# Patient Record
Sex: FEMALE | Race: WHITE | HISPANIC OR LATINO | Employment: FULL TIME | URBAN - METROPOLITAN AREA
[De-identification: names, ages, dates, MRNs, and addresses within clinical notes are randomized per-mention and may not be internally consistent; named-entity substitution may affect disease eponyms.]

---

## 2017-09-17 ENCOUNTER — HOSPITAL ENCOUNTER (EMERGENCY)
Facility: MEDICAL CENTER | Age: 27
End: 2017-09-17
Attending: EMERGENCY MEDICINE
Payer: COMMERCIAL

## 2017-09-17 VITALS
WEIGHT: 158.73 LBS | HEART RATE: 81 BPM | DIASTOLIC BLOOD PRESSURE: 59 MMHG | TEMPERATURE: 98.9 F | RESPIRATION RATE: 16 BRPM | BODY MASS INDEX: 32 KG/M2 | SYSTOLIC BLOOD PRESSURE: 113 MMHG | HEIGHT: 59 IN | OXYGEN SATURATION: 98 %

## 2017-09-17 DIAGNOSIS — H66.92 ACUTE LEFT OTITIS MEDIA: ICD-10-CM

## 2017-09-17 DIAGNOSIS — H61.22 IMPACTED CERUMEN OF LEFT EAR: ICD-10-CM

## 2017-09-17 PROCEDURE — A9270 NON-COVERED ITEM OR SERVICE: HCPCS | Performed by: EMERGENCY MEDICINE

## 2017-09-17 PROCEDURE — 69209 REMOVE IMPACTED EAR WAX UNI: CPT

## 2017-09-17 PROCEDURE — 700112 HCHG RX REV CODE 229: Performed by: EMERGENCY MEDICINE

## 2017-09-17 PROCEDURE — 99283 EMERGENCY DEPT VISIT LOW MDM: CPT

## 2017-09-17 PROCEDURE — 700102 HCHG RX REV CODE 250 W/ 637 OVERRIDE(OP): Performed by: EMERGENCY MEDICINE

## 2017-09-17 RX ORDER — HYDROCODONE BITARTRATE AND ACETAMINOPHEN 5; 325 MG/1; MG/1
1 TABLET ORAL ONCE
Status: COMPLETED | OUTPATIENT
Start: 2017-09-17 | End: 2017-09-17

## 2017-09-17 RX ORDER — DOCUSATE SODIUM 50 MG/5ML
100 LIQUID ORAL ONCE
Status: COMPLETED | OUTPATIENT
Start: 2017-09-17 | End: 2017-09-17

## 2017-09-17 RX ORDER — AMOXICILLIN 875 MG/1
875 TABLET, COATED ORAL 2 TIMES DAILY
Qty: 20 TAB | Refills: 0 | Status: SHIPPED | OUTPATIENT
Start: 2017-09-17

## 2017-09-17 RX ORDER — AMOXICILLIN 250 MG/1
500 CAPSULE ORAL ONCE
Status: COMPLETED | OUTPATIENT
Start: 2017-09-17 | End: 2017-09-17

## 2017-09-17 RX ADMIN — AMOXICILLIN 500 MG: 250 CAPSULE ORAL at 20:59

## 2017-09-17 RX ADMIN — HYDROCODONE BITARTRATE AND ACETAMINOPHEN 1 TABLET: 5; 325 TABLET ORAL at 20:12

## 2017-09-17 RX ADMIN — DOCUSATE SODIUM 100 MG: 50 LIQUID ORAL at 20:18

## 2017-09-17 ASSESSMENT — PAIN SCALES - GENERAL: PAINLEVEL_OUTOF10: 5

## 2017-09-18 NOTE — ED NOTES
Discharge instructions and rx given. Educated on when to return to ed and follow up with ENT. Pt verbalized understanding.

## 2017-09-18 NOTE — ED PROVIDER NOTES
"ED Provider Note    CHIEF COMPLAINT  Chief Complaint   Patient presents with   • Earache       HPI  Luci Mcclain is a 27 y.o. female who presentsFor evaluation of pain to her left ear that started yesterday. She complains of some decreased hearing she's had no trauma to the year denies any recent swimming she has no fever chills headache. Denies any history of previous infections that she is aware of    REVIEW OF SYSTEMS  See HPI for further details. She denies cough nausea vomiting abdominal pain    PAST MEDICAL HISTORY  No past medical history on file.    FAMILY HISTORY  History reviewed. No pertinent family history.    SOCIAL HISTORY  Social History     Social History   • Marital status:      Spouse name: N/A   • Number of children: N/A   • Years of education: N/A     Social History Main Topics   • Smoking status: Never Smoker   • Smokeless tobacco: Never Used   • Alcohol use Yes      Comment: Occasionally   • Drug use: No   • Sexual activity: Not on file     Other Topics Concern   • Not on file     Social History Narrative   • No narrative on file       SURGICAL HISTORY  No past surgical history on file.    CURRENT MEDICATIONS  Home Medications     Reviewed by Gurinder Cali R.N. (Registered Nurse) on 09/17/17 at 1843  Med List Status: Complete   Medication Last Dose Status        Patient Franco Taking any Medications                        ALLERGIES  No Known Allergies    PHYSICAL EXAM  VITAL SIGNS: Pulse 88   Temp 36.9 °C (98.4 °F)   Resp 18   Ht 1.499 m (4' 11\") Comment: Stated  Wt 72 kg (158 lb 11.7 oz)   LMP 08/20/2017   SpO2 98%   BMI 32.06 kg/m²   Constitutional :  Well developed, Well nourished, No acute distress, Non-toxic appearance.   HENT:Head is atraumatic normocephalic left tympanic membrane initially is not visualized secondary to cerumen impaction after removal of the impaction. Erythematous and there is evidence of some calcification of the eardrum right is " clear  Eyes: Normal-appearing  Neck: Normal range of motion, No tenderness, Supple, No stridor.   Lymphatic: No cervical adenopathy.   Thorax & Lungs: No respiratory distress  Skin: Warm, Dry, No erythema, No rash.   Hearing is grossly intact bilaterally            COURSE & MEDICAL DECISION MAKING  Pertinent Labs & Imaging studies reviewed. (See chart for details)  Patient presents with complaint of left ear pain. Colace was placed in the ear by nursing staff and the ear irrigated after removal of impaction her hearing seemed to improve and she has evidence of otitis media. This will be treated with amoxicillin 875 twice a day I ordered her 1st dose administered in the emergency department she was given one Norco in the emergency department for pain which has been effective. She is discharged stable condition to follow up with Dr. Lange ear nose and throat specialist in 1 week after treatment. Ibuprofen is recommended for breakthrough pain  FINAL IMPRESSION  1. Acute left otitis media  2.   3.      Electronically signed by: Eliot Rosa, 9/17/2017

## 2017-09-18 NOTE — DISCHARGE INSTRUCTIONS

## 2022-11-19 ENCOUNTER — HOSPITAL ENCOUNTER (EMERGENCY)
Facility: MEDICAL CENTER | Age: 32
End: 2022-11-19
Attending: EMERGENCY MEDICINE
Payer: COMMERCIAL

## 2022-11-19 ENCOUNTER — APPOINTMENT (OUTPATIENT)
Dept: RADIOLOGY | Facility: MEDICAL CENTER | Age: 32
End: 2022-11-19
Attending: EMERGENCY MEDICINE
Payer: COMMERCIAL

## 2022-11-19 VITALS
SYSTOLIC BLOOD PRESSURE: 143 MMHG | TEMPERATURE: 98.2 F | BODY MASS INDEX: 31.41 KG/M2 | OXYGEN SATURATION: 97 % | DIASTOLIC BLOOD PRESSURE: 90 MMHG | HEIGHT: 60 IN | HEART RATE: 80 BPM | WEIGHT: 160 LBS | RESPIRATION RATE: 20 BRPM

## 2022-11-19 DIAGNOSIS — S46.912A STRAIN OF LEFT SHOULDER, INITIAL ENCOUNTER: ICD-10-CM
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
## 2022-11-19 PROCEDURE — 307740 HCHG GREEN TRAUMA TEAM SERVICES

## 2022-11-19 PROCEDURE — 99284 EMERGENCY DEPT VISIT MOD MDM: CPT

## 2022-11-19 PROCEDURE — 73030 X-RAY EXAM OF SHOULDER: CPT | Mod: LT

## 2022-11-19 PROCEDURE — 71045 X-RAY EXAM CHEST 1 VIEW: CPT

## 2022-11-19 PROCEDURE — 700102 HCHG RX REV CODE 250 W/ 637 OVERRIDE(OP): Performed by: EMERGENCY MEDICINE

## 2022-11-19 PROCEDURE — A9270 NON-COVERED ITEM OR SERVICE: HCPCS | Performed by: EMERGENCY MEDICINE

## 2022-11-19 RX ORDER — NAPROXEN 500 MG/1
500 TABLET ORAL 2 TIMES DAILY WITH MEALS
Qty: 14 TABLET | Refills: 0 | Status: SHIPPED | OUTPATIENT
Start: 2022-11-19 | End: 2022-11-26

## 2022-11-19 RX ORDER — CYCLOBENZAPRINE HCL 10 MG
10 TABLET ORAL 3 TIMES DAILY PRN
Qty: 12 TABLET | Refills: 0 | Status: SHIPPED | OUTPATIENT
Start: 2022-11-19

## 2022-11-19 RX ORDER — OXYCODONE HYDROCHLORIDE 5 MG/1
5 TABLET ORAL ONCE
Status: COMPLETED | OUTPATIENT
Start: 2022-11-19 | End: 2022-11-19

## 2022-11-19 RX ADMIN — OXYCODONE 5 MG: 5 TABLET ORAL at 13:17
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
## 2022-11-19 NOTE — ED NOTES
Pt discharge home. Pt given discharge instructions and prescription. Pt verbalized understanding, all questions answered ,vss upon d/c. Pt steady on feet upon discharge    will be driving pt home
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
## 2025-03-03 ENCOUNTER — APPOINTMENT (OUTPATIENT)
Dept: URGENT CARE | Facility: PHYSICIAN GROUP | Age: 35
End: 2025-03-03
Payer: COMMERCIAL

## 2025-03-03 ENCOUNTER — OFFICE VISIT (OUTPATIENT)
Dept: URGENT CARE | Facility: PHYSICIAN GROUP | Age: 35
End: 2025-03-03
Payer: COMMERCIAL

## 2025-03-03 VITALS
BODY MASS INDEX: 35.34 KG/M2 | DIASTOLIC BLOOD PRESSURE: 84 MMHG | HEART RATE: 90 BPM | SYSTOLIC BLOOD PRESSURE: 136 MMHG | OXYGEN SATURATION: 94 % | RESPIRATION RATE: 18 BRPM | HEIGHT: 60 IN | TEMPERATURE: 97.2 F | WEIGHT: 180 LBS

## 2025-03-03 DIAGNOSIS — H66.002 NON-RECURRENT ACUTE SUPPURATIVE OTITIS MEDIA OF LEFT EAR WITHOUT SPONTANEOUS RUPTURE OF TYMPANIC MEMBRANE: ICD-10-CM

## 2025-03-03 DIAGNOSIS — R09.81 SINUS CONGESTION: ICD-10-CM

## 2025-03-03 DIAGNOSIS — H60.312 ACUTE DIFFUSE OTITIS EXTERNA OF LEFT EAR: ICD-10-CM

## 2025-03-03 PROCEDURE — 99203 OFFICE O/P NEW LOW 30 MIN: CPT | Performed by: NURSE PRACTITIONER

## 2025-03-03 PROCEDURE — 3075F SYST BP GE 130 - 139MM HG: CPT | Performed by: NURSE PRACTITIONER

## 2025-03-03 PROCEDURE — 3079F DIAST BP 80-89 MM HG: CPT | Performed by: NURSE PRACTITIONER

## 2025-03-03 RX ORDER — OFLOXACIN 3 MG/ML
10 SOLUTION AURICULAR (OTIC) DAILY
Qty: 14 ML | Refills: 0 | Status: SHIPPED | OUTPATIENT
Start: 2025-03-03 | End: 2025-03-10

## 2025-03-03 RX ORDER — AMOXICILLIN 875 MG/1
875 TABLET, COATED ORAL 2 TIMES DAILY
Qty: 14 TABLET | Refills: 0 | Status: SHIPPED | OUTPATIENT
Start: 2025-03-03 | End: 2025-03-10

## 2025-03-03 NOTE — PROGRESS NOTES
Verbal consent was acquired by the patient to use [employer] ambient listening note generation during this visit          Chief Complaint   Patient presents with    Ear Pain     L ear pain, onset yesterday           History of Present Illness  The patient is a 34-year-old female who presents for evaluation of ear pain.    She reports an abrupt onset of ear pain, described as throbbing, accompanied by significant pressure and impaired hearing. These symptoms began yesterday while she was lying on the affected ear. The pain has slightly improved since its onset, but the pressure remains constant. She also experienced a runny nose upon awakening today but reports no other symptoms such as headache or body aches. She recalls a similar episode of ear infection approximately 3 years ago, which was characterized by swelling. Additionally, she mentions an incident last week where she experienced itching inside her ear, which she attempted to alleviate by scratching with her finger and possibly a Q-tip. She expresses concern about her hearing and notes that she has previously had issues with her sinus passages not draining properly. She currently feels congested but does not take any medications for allergies.    Supplemental Information  She has a history of eczema and uses moisturizing lotion for management.         ROS:    No severe shortness of breath   No cardiac like chest pain, as discussed   As otherwise stated in HPI    Medical/SX/ Social History:  Reviewed per chart    Pertinent Medications:    Current Outpatient Medications on File Prior to Visit   Medication Sig Dispense Refill    METFORMIN HCL PO Take  by mouth.      cyclobenzaprine (FLEXERIL) 10 mg Tab Take 1 Tablet by mouth 3 times a day as needed for Moderate Pain. (Patient not taking: Reported on 3/3/2025) 12 Tablet 0     No current facility-administered medications on file prior to visit.        Allergies:    Patient has no known allergies.     Problem  list, medications, and allergies reviewed by myself today in Epic     Physical Exam:    Vitals:    03/03/25 1309   BP: 136/84   Pulse:    Resp:    Temp:    SpO2:              Physical Exam  Vitals and nursing note reviewed.   Constitutional:       General: She is not in acute distress.     Appearance: Normal appearance. She is not ill-appearing or toxic-appearing.   HENT:      Head: Normocephalic and atraumatic.      Right Ear: Hearing, tympanic membrane, ear canal and external ear normal.      Left Ear: Decreased hearing noted. Drainage, swelling and tenderness present. A middle ear effusion is present. Tympanic membrane is erythematous and bulging.      Nose: Nose normal.      Mouth/Throat:      Mouth: Mucous membranes are moist.      Pharynx: Oropharynx is clear.   Eyes:      Extraocular Movements: Extraocular movements intact.      Conjunctiva/sclera: Conjunctivae normal.      Pupils: Pupils are equal, round, and reactive to light.   Cardiovascular:      Rate and Rhythm: Normal rate.      Pulses: Normal pulses.   Pulmonary:      Effort: Pulmonary effort is normal.   Musculoskeletal:         General: Normal range of motion.      Cervical back: Normal range of motion.   Skin:     General: Skin is warm.      Capillary Refill: Capillary refill takes less than 2 seconds.   Neurological:      General: No focal deficit present.      Mental Status: She is alert and oriented to person, place, and time.          Medical Decision making and plan :  I personally reviewed prior external notes and test results pertinent to today's visit. Pt is clinically stable at today's acute urgent care visit.  Patient appears nontoxic with no acute distress noted. Appropriate for outpatient care at this time.    Pleasant 34 y.o. female presented clinic with:     Assessment & Plan  1. Otitis media.  The patient reports ear pain, pressure, and hearing difficulties that started yesterday. Examination revealed an infection in the eardrum.  Amoxicillin has been prescribed to treat the infection. She is advised to monitor her symptoms and return for a follow-up visit if there is no improvement after completing the antibiotic course.    2. Otitis externa.  The patient also has an infection in the ear canal. Eardrops have been prescribed to address this condition. She is advised to avoid scratching the ear with her finger or Q-tip to prevent further irritation and potential infection.    3. Sinus congestion.  The patient reports feeling congested, which may be contributing to her ear symptoms. Over-the-counter medications such as Claritin or Zyrtec, along with Flonase nasal spray, have been recommended to help alleviate sinus congestion and prevent fluid buildup in the ears.      Shared decision-making was utilized with patient for treatment plan. Medication discussed included indication for use and the potential benefits and side effects. Education was provided regarding the aforementioned assessments.  Differential Diagnosis, natural history, and supportive care discussed. All of the patient's questions were answered to their satisfaction at the time of discharge. Patient was encouraged to monitor symptoms closely. Those signs and symptoms which would warrant concern and mandate seeking a higher level of service through the emergency department discussed at length.  Patient stated agreement and understanding of this plan of care.    Disposition:  Home in stable condition     Voice Recognition Disclaimer:  Portions of this document were created using voice recognition software and [employer] technology provided by [employer]. The software does have a chance of producing errors of grammar and possibly content. I have made every reasonable attempt to correct obvious errors, but there may be errors of grammar and possibly content that I did not discover before finalizing the  documentation.    FELIZ Arizmendi.

## 2025-06-09 ENCOUNTER — OFFICE VISIT (OUTPATIENT)
Dept: URGENT CARE | Facility: PHYSICIAN GROUP | Age: 35
End: 2025-06-09
Payer: COMMERCIAL

## 2025-06-09 VITALS
BODY MASS INDEX: 35.93 KG/M2 | RESPIRATION RATE: 20 BRPM | TEMPERATURE: 97.5 F | WEIGHT: 183 LBS | DIASTOLIC BLOOD PRESSURE: 70 MMHG | HEIGHT: 60 IN | OXYGEN SATURATION: 99 % | SYSTOLIC BLOOD PRESSURE: 138 MMHG | HEART RATE: 82 BPM

## 2025-06-09 DIAGNOSIS — R21 RASH: Primary | ICD-10-CM

## 2025-06-09 PROCEDURE — 99213 OFFICE O/P EST LOW 20 MIN: CPT | Performed by: STUDENT IN AN ORGANIZED HEALTH CARE EDUCATION/TRAINING PROGRAM

## 2025-06-09 PROCEDURE — 3078F DIAST BP <80 MM HG: CPT | Performed by: STUDENT IN AN ORGANIZED HEALTH CARE EDUCATION/TRAINING PROGRAM

## 2025-06-09 PROCEDURE — 3075F SYST BP GE 130 - 139MM HG: CPT | Performed by: STUDENT IN AN ORGANIZED HEALTH CARE EDUCATION/TRAINING PROGRAM

## 2025-06-09 RX ORDER — NYSTATIN 100000 U/G
1 CREAM TOPICAL 2 TIMES DAILY
Qty: 15 G | Refills: 0 | Status: SHIPPED | OUTPATIENT
Start: 2025-06-09

## 2025-06-09 RX ORDER — METHYLPREDNISOLONE 4 MG/1
TABLET ORAL
Qty: 21 TABLET | Refills: 0 | Status: SHIPPED | OUTPATIENT
Start: 2025-06-09

## 2025-06-09 NOTE — PROGRESS NOTES
Subjective:   Luci Delgado is a 35 y.o. female who presents for Rash (All over her body,x5 days)      HPI:  The 35-year-old female presents with rash that started on her back of her hands and arms and spread up to the neck side of her face.  She reports initially started as an itchy red rash but now spread up into her neck and her neck is a little bit tender but still very itchy red and slightly splotchy.  She has some hives present to the area as well.  No known exposures no new soaps no new lotions no new jewelry.    Review of Systems   Skin:  Positive for itching and rash.       Medications:    cyclobenzaprine Tabs  METFORMIN HCL PO    Allergies: Patient has no known allergies.    Problem List: Luci Delgado does not have a problem list on file.    Surgical History:  No past surgical history on file.    Past Social Hx: Luci Delgado  reports that she has never smoked. She has never used smokeless tobacco. She reports current alcohol use. She reports that she does not use drugs.     Past Family Hx:  Luci Delgado family history is not on file.     Problem list, medications, and allergies reviewed by myself today in Epic.     Objective:     /70   Pulse 82   Temp 36.4 °C (97.5 °F) (Temporal)   Resp 20   Ht 1.524 m (5')   Wt 83 kg (183 lb)   SpO2 99%   BMI 35.74 kg/m²     Physical Exam  Constitutional:       Appearance: Normal appearance.   Cardiovascular:      Rate and Rhythm: Normal rate and regular rhythm.      Pulses: Normal pulses.      Heart sounds: Normal heart sounds.   Pulmonary:      Effort: Pulmonary effort is normal.      Breath sounds: Normal breath sounds.   Skin:     Findings: Rash present. Rash is urticarial. Rash is not crusting, nodular, scaling or vesicular.             Comments: Predominant rash on her neck looks the worst.  Bilateral upper arms with mild erythematous rash is seems to be improving.  No  "significant vesicles no pustules.  No discharge from the wound.  Erythema and hives on bilateral neck folds   Neurological:      Mental Status: She is alert.         Assessment/Plan:     Diagnosis and associated orders:     1. Rash  methylPREDNISolone (MEDROL DOSEPAK) 4 MG Tablet Therapy Pack    nystatin (MYCOSTATIN) [id number] UNIT/GM Cream topical cream         Comments/MDM:     1. Rash (Primary)  I discussed small conservative treatment with topical corticosteroid.  The patient reports \"diffuse quite itchy rash  which I believe may warrant systemic corticosteroid.  - Appearance of the rash does look like hives/contact versus allergic dermatitis.  No the rash on her neck with increasing in tenderness and itchiness with possible has a yeast component though does not have any satellite lesions or punctate lesions.  - I will send in the Dosepak as below and recommended daily antihistamine to be taken.  - If no improvement on her symptoms I believe it is reasonable to try the nystatin cream on her neck.  Though given how the rash initially started on bilateral hands and spread up her arms and to her neck I believe yeast less likely.  -Return precautions discussed with patient  - methylPREDNISolone (MEDROL DOSEPAK) 4 MG Tablet Therapy Pack; Follow schedule on package instructions.  Dispense: 21 Tablet; Refill: 0  - nystatin (MYCOSTATIN) [id number] UNIT/GM Cream topical cream; Apply 1 g topically 2 times a day.  Dispense: 15 g; Refill: 0           Differential diagnosis, natural history, supportive care, and indications for immediate follow-up discussed.    Advised the patient to follow-up with the primary care physician for recheck, reevaluation, and consideration of further management.    Please note that this dictation was created using voice recognition software. I have made a reasonable attempt to correct obvious errors, but I expect that there are errors of grammar and possibly content that I did not discover before " finalizing the note.    Yordan Hadley M.D.

## 2025-06-09 NOTE — LETTER
June 9, 2025    To Whom It May Concern:         This is confirmation that Luci Delgado attended her scheduled appointment with Yordan Hadley M.D. on 6/09/25.         If you have any questions please do not hesitate to call me at the phone number listed below.    Sincerely,          Yordan Hadley M.D.  [phone]